# Patient Record
Sex: MALE | Race: OTHER | HISPANIC OR LATINO | URBAN - METROPOLITAN AREA
[De-identification: names, ages, dates, MRNs, and addresses within clinical notes are randomized per-mention and may not be internally consistent; named-entity substitution may affect disease eponyms.]

---

## 2020-05-20 ENCOUNTER — EMERGENCY (EMERGENCY)
Facility: HOSPITAL | Age: 25
LOS: 1 days | Discharge: HOME | End: 2020-05-20
Attending: STUDENT IN AN ORGANIZED HEALTH CARE EDUCATION/TRAINING PROGRAM | Admitting: STUDENT IN AN ORGANIZED HEALTH CARE EDUCATION/TRAINING PROGRAM
Payer: OTHER MISCELLANEOUS

## 2020-05-20 VITALS
DIASTOLIC BLOOD PRESSURE: 90 MMHG | SYSTOLIC BLOOD PRESSURE: 131 MMHG | RESPIRATION RATE: 16 BRPM | TEMPERATURE: 99 F | HEART RATE: 73 BPM | OXYGEN SATURATION: 100 %

## 2020-05-20 LAB
ALBUMIN SERPL ELPH-MCNC: 4.5 G/DL — SIGNIFICANT CHANGE UP (ref 3.3–5)
ALP SERPL-CCNC: 91 U/L — SIGNIFICANT CHANGE UP (ref 40–120)
ALT FLD-CCNC: 30 U/L — SIGNIFICANT CHANGE UP (ref 4–41)
ANION GAP SERPL CALC-SCNC: 13 MMO/L — SIGNIFICANT CHANGE UP (ref 7–14)
APTT BLD: 33.5 SEC — SIGNIFICANT CHANGE UP (ref 27.5–36.3)
AST SERPL-CCNC: 23 U/L — SIGNIFICANT CHANGE UP (ref 4–40)
BASOPHILS # BLD AUTO: 0.05 K/UL — SIGNIFICANT CHANGE UP (ref 0–0.2)
BASOPHILS NFR BLD AUTO: 0.8 % — SIGNIFICANT CHANGE UP (ref 0–2)
BILIRUB SERPL-MCNC: < 0.2 MG/DL — LOW (ref 0.2–1.2)
BUN SERPL-MCNC: 17 MG/DL — SIGNIFICANT CHANGE UP (ref 7–23)
CALCIUM SERPL-MCNC: 9.4 MG/DL — SIGNIFICANT CHANGE UP (ref 8.4–10.5)
CHLORIDE SERPL-SCNC: 104 MMOL/L — SIGNIFICANT CHANGE UP (ref 98–107)
CO2 SERPL-SCNC: 26 MMOL/L — SIGNIFICANT CHANGE UP (ref 22–31)
COHGB MFR BLDV: 0.3 % — LOW (ref 0.5–1.5)
COHGB MFR BLDV: 14.7 G/DL — SIGNIFICANT CHANGE UP (ref 13–17)
CREAT SERPL-MCNC: 1.16 MG/DL — SIGNIFICANT CHANGE UP (ref 0.5–1.3)
EOSINOPHIL # BLD AUTO: 0.28 K/UL — SIGNIFICANT CHANGE UP (ref 0–0.5)
EOSINOPHIL NFR BLD AUTO: 4.7 % — SIGNIFICANT CHANGE UP (ref 0–6)
GLUCOSE SERPL-MCNC: 89 MG/DL — SIGNIFICANT CHANGE UP (ref 70–99)
HCT VFR BLD CALC: 43.9 % — SIGNIFICANT CHANGE UP (ref 39–50)
HGB BLD-MCNC: 14.1 G/DL — SIGNIFICANT CHANGE UP (ref 13–17)
IMM GRANULOCYTES NFR BLD AUTO: 0.2 % — SIGNIFICANT CHANGE UP (ref 0–1.5)
INR BLD: 1.03 — SIGNIFICANT CHANGE UP (ref 0.88–1.17)
LIDOCAIN IGE QN: 20.6 U/L — SIGNIFICANT CHANGE UP (ref 7–60)
LYMPHOCYTES # BLD AUTO: 2.63 K/UL — SIGNIFICANT CHANGE UP (ref 1–3.3)
LYMPHOCYTES # BLD AUTO: 43.7 % — SIGNIFICANT CHANGE UP (ref 13–44)
MCHC RBC-ENTMCNC: 29 PG — SIGNIFICANT CHANGE UP (ref 27–34)
MCHC RBC-ENTMCNC: 32.1 % — SIGNIFICANT CHANGE UP (ref 32–36)
MCV RBC AUTO: 90.1 FL — SIGNIFICANT CHANGE UP (ref 80–100)
METHGB MFR BLDV: 1 % — SIGNIFICANT CHANGE UP (ref 0–1.5)
MONOCYTES # BLD AUTO: 0.56 K/UL — SIGNIFICANT CHANGE UP (ref 0–0.9)
MONOCYTES NFR BLD AUTO: 9.3 % — SIGNIFICANT CHANGE UP (ref 2–14)
NEUTROPHILS # BLD AUTO: 2.49 K/UL — SIGNIFICANT CHANGE UP (ref 1.8–7.4)
NEUTROPHILS NFR BLD AUTO: 41.3 % — LOW (ref 43–77)
NRBC # FLD: 0 K/UL — SIGNIFICANT CHANGE UP (ref 0–0)
OXYHGB MFR BLDV: 38.4 % — LOW (ref 59–84)
PLATELET # BLD AUTO: 175 K/UL — SIGNIFICANT CHANGE UP (ref 150–400)
PMV BLD: 11.5 FL — SIGNIFICANT CHANGE UP (ref 7–13)
POTASSIUM SERPL-MCNC: 4.3 MMOL/L — SIGNIFICANT CHANGE UP (ref 3.5–5.3)
POTASSIUM SERPL-SCNC: 4.3 MMOL/L — SIGNIFICANT CHANGE UP (ref 3.5–5.3)
PROT SERPL-MCNC: 7.3 G/DL — SIGNIFICANT CHANGE UP (ref 6–8.3)
PROTHROM AB SERPL-ACNC: 11.9 SEC — SIGNIFICANT CHANGE UP (ref 9.8–13.1)
RBC # BLD: 4.87 M/UL — SIGNIFICANT CHANGE UP (ref 4.2–5.8)
RBC # FLD: 12.5 % — SIGNIFICANT CHANGE UP (ref 10.3–14.5)
SODIUM SERPL-SCNC: 143 MMOL/L — SIGNIFICANT CHANGE UP (ref 135–145)
WBC # BLD: 6.02 K/UL — SIGNIFICANT CHANGE UP (ref 3.8–10.5)
WBC # FLD AUTO: 6.02 K/UL — SIGNIFICANT CHANGE UP (ref 3.8–10.5)

## 2020-05-20 PROCEDURE — 99053 MED SERV 10PM-8AM 24 HR FAC: CPT

## 2020-05-20 PROCEDURE — 99285 EMERGENCY DEPT VISIT HI MDM: CPT | Mod: 25

## 2020-05-20 PROCEDURE — 93010 ELECTROCARDIOGRAM REPORT: CPT

## 2020-05-20 RX ORDER — ACETAMINOPHEN 500 MG
975 TABLET ORAL ONCE
Refills: 0 | Status: COMPLETED | OUTPATIENT
Start: 2020-05-20 | End: 2020-05-20

## 2020-05-20 RX ORDER — SODIUM CHLORIDE 9 MG/ML
1000 INJECTION INTRAMUSCULAR; INTRAVENOUS; SUBCUTANEOUS ONCE
Refills: 0 | Status: COMPLETED | OUTPATIENT
Start: 2020-05-20 | End: 2020-05-20

## 2020-05-20 RX ADMIN — Medication 975 MILLIGRAM(S): at 23:19

## 2020-05-20 RX ADMIN — SODIUM CHLORIDE 1000 MILLILITER(S): 9 INJECTION INTRAMUSCULAR; INTRAVENOUS; SUBCUTANEOUS at 23:46

## 2020-05-20 NOTE — ED ADULT TRIAGE NOTE - CHIEF COMPLAINT QUOTE
Pt arrives awake, alert. C-collar in place & non-rebreather, found face down unconscious with epistaxis & unresponsive by co-worker. Pt c/o headache x 2 days. Pt was working with stripping and cleaning chemicals & liquids. Airway in tact. No redness. Currently c/o neck pain. Pupils equal and reactive. Reports seeing "black vision and blurred vision" since accident. EKG in progress

## 2020-05-20 NOTE — ED PROVIDER NOTE - PATIENT PORTAL LINK FT
You can access the FollowMyHealth Patient Portal offered by [facility] by registering at the following website: [email or web address]. By joining [email or web address]’s FollowMyHealth portal, you will also be able to view your health information using other applications (apps) compatible with our system.

## 2020-05-20 NOTE — ED PROVIDER NOTE - OBJECTIVE STATEMENT
25M with no pmh presenting with syncope and fall at work tonight.  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 Yesterday  felt the odor and vapors was giving him a severe headache. Today at work states he felt the headache again, had a nosebleed and subsequently passed out and next memory is on floor with coworkers trying to wake him up. Currently endorsing continued headache, and diffuse myalgias in neck, check, abd. Denies sob, nausea, vomiting, blurry vision. 25M with no pmh presenting with syncope and fall at work tonight. 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 Yesterday states felt the odor and vapors were giving him a severe headache. Today at work states he felt the headache again, had a nosebleed and subsequently passed out and next memory is on floor with coworkers trying to wake him up. Currently endorsing continued headache, and neck pain, tingling in right shoulder. no nausea, no vomiting, nosebleed self resolved

## 2020-05-20 NOTE — ED ADULT NURSE NOTE - NSIMPLEMENTINTERV_GEN_ALL_ED
Implemented All Fall Risk Interventions:  [place] to call system. Call bell, personal items and telephone within reach. Instruct patient to call for assistance. Room bathroom lighting operational. Non-slip footwear when patient is off stretcher. Physically safe environment: no spills, clutter or unnecessary equipment. Stretcher in lowest position, wheels locked, appropriate side rails in place. Provide visual cue, wrist band, yellow gown, etc. Monitor gait and stability. Monitor for mental status changes and reorient to person, place, and time. Review medications for side effects contributing to fall risk. Reinforce activity limits and safety measures with patient and family.

## 2020-05-20 NOTE — ED PROVIDER NOTE - NSFOLLOWUPCLINICS_GEN_ALL_ED_FT
[facility]  Cardiology  [address] [place] [address]  Phone: [phone]  Fax:   Follow Up Time: 1-3 Days

## 2020-05-20 NOTE — ED ADULT NURSE NOTE - OBJECTIVE STATEMENT
pt received in room 4, AOx4, Ambulatory at baseline. Pt brought in by EMS c/o chemical inhalation at work. Pt states his first exposure to unidentified chemical was yesterday which caused a headache. Pt states he went back to work today and expressed to his supervisor the chemical was bothering him and his supervisor said  " you have to do it or go home". Pt states throughout his shift he had a headache and started to see black spots accompanied by a nose bleed. Pt states he then passed out about 2 hours later and his coworker found him on the floor. Pt does not know what he hit while falling. Pt denies blurred vision at this time. Pt arrived in c-collar. Upon assessment pt states he has decreased sensory on right side of his face, and right sided pain, along with anterior chest and abdomen pain, pt on cardiac monitor NSR, and on room air sating at 98%. Pt breathing equal and unlabored. 18G placed in left AC. labs sent. Safety measures in place. Will continue to monitor.

## 2020-05-20 NOTE — ED PROVIDER NOTE - PROGRESS NOTE DETAILS
Patient stating feeling better. Labs and imaging unremarkable. Patient understanding of results. Will be given work note to avoid any further exposures to chemicals exposed and pcp follow up.

## 2020-05-20 NOTE — ED PROVIDER NOTE - NS ED ROS FT
GENERAL: No fever or chills  EYES: no change in vision  HEENT: no trouble swallowing or speaking  CARDIAC: chest pain  PULMONARY: no cough or SOB  GI: abdominal pain, no nausea, vomiting, diarrhea, or constipation   : No changes in urination  SKIN: no rashes  NEURO: no headache, numbness, or weakness  MSK: diffuse myalgias GENERAL: No fever or chills  EYES: no change in vision  HEENT: no trouble swallowing or speaking, epistaxis  CARDIAC: chest pain  PULMONARY: no cough or SOB  GI: abdominal pain, no nausea, vomiting, diarrhea, or constipation   : No changes in urination  SKIN: no rashes  NEURO:  headache, no numbness, or weakness  MSK: diffuse myalgias

## 2020-05-20 NOTE — ED PROVIDER NOTE - CLINICAL SUMMARY MEDICAL DECISION MAKING FREE TEXT BOX
Patient presenting chemical exposure with subsequent headache, epistaxis and syncope. Neuro intact, patient well appearing. Diffuse tenderness likely muscular from fall. Will obtain trauma imaging, screening labs.

## 2020-05-20 NOTE — ED PROVIDER NOTE - NSFOLLOWUPINSTRUCTIONS_ED_ALL_ED_FT
1. A copy of any of your resulted labs, imaging, and findings have been provided and discussed with you.   2. Follow up with your primary care doctor within 48 hours to assess the symptoms you were seen for in the emergency department and to review all results from your visit. If you don't have a doctor, call [phone] to make an appointment.  3. Avoid any further exposures to chemicals that you were working with today.   4. Return immediately to the emergency department for new, persistent, or worsening symptoms or signs. 1. A copy of any of your resulted labs, imaging, and findings have been provided and discussed with you.   2. Follow up with your primary care doctor within 48 hours to assess the symptoms you were seen for in the emergency department and to review all results from your visit. If you don't have a doctor, call [phone] to make an appointment.  3. Avoid any further exposures to chemicals that you were working with today.   4. Schedule follow up with cardiologist.  5. Return immediately to the emergency department for new, persistent, or worsening symptoms or signs.

## 2020-05-20 NOTE — ED PROVIDER NOTE - PHYSICAL EXAMINATION
GEN: NAD, awake, well appearing  HEENT: MMM, normal conjunctiva, perrl  CHEST/LUNGS: Non-tachypneic, CTAB, bilateral breath sounds  CARDIAC: Non-tachycardic, s1s2, normal perfusion, no peripheral edema  ABDOMEN: Soft, mild diffuse left sided abd tenderness, No rebound/guarding  MSK: diffuse neck tenderness and along right trapezius, no step offs, diffuse left chest wall tenderness  SKIN: No rashes, no petechiae, no vesicles  NEURO: CN grossly intact, normal coordination, no focal motor or sensory deficits

## 2020-05-21 VITALS
DIASTOLIC BLOOD PRESSURE: 63 MMHG | HEART RATE: 68 BPM | TEMPERATURE: 98 F | SYSTOLIC BLOOD PRESSURE: 106 MMHG | RESPIRATION RATE: 14 BRPM | OXYGEN SATURATION: 100 %

## 2020-05-21 LAB — LACTATE SERPL-SCNC: 1.2 MMOL/L — SIGNIFICANT CHANGE UP (ref 0.5–2)

## 2020-05-21 PROCEDURE — 74177 CT ABD & PELVIS W/CONTRAST: CPT | Mod: 26

## 2020-05-21 PROCEDURE — 71260 CT THORAX DX C+: CPT | Mod: 26

## 2020-05-21 PROCEDURE — 72125 CT NECK SPINE W/O DYE: CPT | Mod: 26

## 2020-05-21 PROCEDURE — 70450 CT HEAD/BRAIN W/O DYE: CPT | Mod: 26

## 2020-05-21 RX ORDER — LIDOCAINE 4 G/100G
1 CREAM TOPICAL ONCE
Refills: 0 | Status: COMPLETED | OUTPATIENT
Start: 2020-05-21 | End: 2020-05-21

## 2020-05-21 RX ORDER — KETOROLAC TROMETHAMINE 30 MG/ML
15 SYRINGE (ML) INJECTION ONCE
Refills: 0 | Status: DISCONTINUED | OUTPATIENT
Start: 2020-05-21 | End: 2020-05-21

## 2020-05-21 RX ADMIN — SODIUM CHLORIDE 1000 MILLILITER(S): 9 INJECTION INTRAMUSCULAR; INTRAVENOUS; SUBCUTANEOUS at 01:52

## 2020-05-21 RX ADMIN — Medication 15 MILLIGRAM(S): at 03:20

## 2020-05-21 RX ADMIN — LIDOCAINE 1 PATCH: 4 CREAM TOPICAL at 03:20

## 2020-05-21 NOTE — ED ADULT NURSE REASSESSMENT NOTE - NS ED NURSE REASSESS COMMENT FT1
pt AOx4, unchanged mental status. Pt returned from CT scan. Pt denies chest pain, SOB, blurrd vision, headache at this time. v/s stable. awaiting results. safety measures in place. will continue to monitor.
pt resting comfortable. pt plan or care is be d/c, awaiting . will continue to monitor.